# Patient Record
Sex: FEMALE | Race: WHITE
[De-identification: names, ages, dates, MRNs, and addresses within clinical notes are randomized per-mention and may not be internally consistent; named-entity substitution may affect disease eponyms.]

---

## 2018-03-13 NOTE — RAD
Thyroid ultrasound, 3/13/2018:



History: Thyroid enlargement



The right lobe of the gland measures 4.1 x 1.6 x 1.0 cm while the left lobe of

the gland measures 4.7 x 1.1 x 1.3 cm.  The gland is generally heterogeneous

with multiple tiny hypoechoic foci in both lobes.



There is a dominant 13 x 12 x 11 mm discrete solid nodule in the lower pole of

the left lobe of the gland.  It is relatively isoechoic.  Its margins are

smooth with a hypoechoic halo.  There is internal color flow..  No internal

calcifications are seen.



An 8 x 4 x 6 mm well-defined hypoechoic nodule is noted anteriorly in the mid

portion of the right lobe of the thyroid gland.  It demonstrates low level

internal echoes.  It is wider than tall.  No internal calcifications are

present.



A 4 x 5 x 4 mm smooth hypoechoic nodule is noted in the upper pole of the left

lobe of the gland.  This is probably a solid nodule.  No associated

calcifications are seen.





IMPRESSION: Heterogeneous multinodular thyroid gland as described above.  The

sonographic characteristics of the individual nodules are nonspecific.  No

highly suspicious features are seen.  Sonographic follow-up is suggested.

## 2018-04-09 ENCOUNTER — HOSPITAL ENCOUNTER (OUTPATIENT)
Dept: HOSPITAL 61 - KCIC MRI | Age: 38
Discharge: HOME | End: 2018-04-09
Payer: COMMERCIAL

## 2018-04-09 DIAGNOSIS — M94.261: Primary | ICD-10-CM

## 2018-04-09 PROCEDURE — 73721 MRI JNT OF LWR EXTRE W/O DYE: CPT

## 2020-01-02 ENCOUNTER — HOSPITAL ENCOUNTER (OUTPATIENT)
Dept: HOSPITAL 63 - PMG | Age: 40
Discharge: HOME | End: 2020-01-02
Attending: NURSE PRACTITIONER
Payer: COMMERCIAL

## 2020-01-02 DIAGNOSIS — R05: ICD-10-CM

## 2020-01-02 DIAGNOSIS — J45.909: Primary | ICD-10-CM

## 2020-01-02 PROCEDURE — 71046 X-RAY EXAM CHEST 2 VIEWS: CPT

## 2020-01-02 NOTE — RAD
CHEST PA   LATERAL

 

History: Shortness of breath 

 

Comparison: None.

 

Findings: 

Frontal and lateral views of chest were obtained. The cardiomediastinal 

silhouette is normal. Pulmonary vasculature is normal. The lungs are 

clear. No pleural effusion or pneumothorax is seen. There is no acute bone

abnormality. 

 

IMPRESSION: 

No acute cardiopulmonary process. 

 

 

Electronically signed by: Enrique Noble MD (1/2/2020 9:58 AM) [facility]

## 2021-02-05 ENCOUNTER — HOSPITAL ENCOUNTER (EMERGENCY)
Dept: HOSPITAL 63 - ER | Age: 41
Discharge: HOME | End: 2021-02-05
Payer: COMMERCIAL

## 2021-02-05 VITALS
DIASTOLIC BLOOD PRESSURE: 67 MMHG | SYSTOLIC BLOOD PRESSURE: 122 MMHG | BODY MASS INDEX: 33.56 KG/M2 | WEIGHT: 213.85 LBS | HEIGHT: 67 IN | SYSTOLIC BLOOD PRESSURE: 122 MMHG | DIASTOLIC BLOOD PRESSURE: 67 MMHG

## 2021-02-05 DIAGNOSIS — E03.9: ICD-10-CM

## 2021-02-05 DIAGNOSIS — I95.1: ICD-10-CM

## 2021-02-05 DIAGNOSIS — R55: ICD-10-CM

## 2021-02-05 DIAGNOSIS — T88.1XXA: Primary | ICD-10-CM

## 2021-02-05 DIAGNOSIS — Z88.1: ICD-10-CM

## 2021-02-05 DIAGNOSIS — Z88.7: ICD-10-CM

## 2021-02-05 DIAGNOSIS — Z88.2: ICD-10-CM

## 2021-02-05 DIAGNOSIS — J45.909: ICD-10-CM

## 2021-02-05 LAB
ALBUMIN SERPL-MCNC: 3.5 G/DL (ref 3.4–5)
ALP SERPL-CCNC: 63 U/L (ref 46–116)
ALT SERPL-CCNC: 18 U/L (ref 14–59)
AMPHETAMINE/METHAMPHETAMINE: (no result)
ANION GAP SERPL CALC-SCNC: 6 MMOL/L (ref 6–14)
APTT PPP: YELLOW S
AST SERPL-CCNC: 11 U/L (ref 15–37)
BACTERIA #/AREA URNS HPF: (no result) /HPF
BARBITURATES UR-MCNC: (no result) UG/ML
BASOPHILS # BLD AUTO: 0 X10^3/UL (ref 0–0.2)
BASOPHILS NFR BLD: 0 % (ref 0–3)
BENZODIAZ UR-MCNC: (no result) UG/L
BILIRUB DIRECT SERPL-MCNC: 0.1 MG/DL (ref 0–0.2)
BILIRUB SERPL-MCNC: 0.6 MG/DL (ref 0.2–1)
BILIRUB UR QL STRIP: (no result)
CA-I SERPL ISE-MCNC: 15 MG/DL (ref 7–20)
CALCIUM SERPL-MCNC: 8.4 MG/DL (ref 8.5–10.1)
CANNABINOIDS UR-MCNC: (no result) UG/L
CHLORIDE SERPL-SCNC: 102 MMOL/L (ref 98–107)
CHOLEST/HDLC SERPL: 4 {RATIO}
CO2 SERPL-SCNC: 27 MMOL/L (ref 21–32)
COCAINE UR-MCNC: (no result) NG/ML
CREAT SERPL-MCNC: 0.9 MG/DL (ref 0.6–1)
EOSINOPHIL NFR BLD: 0.1 X10^3/UL (ref 0–0.7)
EOSINOPHIL NFR BLD: 1 % (ref 0–3)
ERYTHROCYTE [DISTWIDTH] IN BLOOD BY AUTOMATED COUNT: 13.4 % (ref 11.5–14.5)
FIBRINOGEN PPP-MCNC: CLEAR MG/DL
GFR SERPLBLD BASED ON 1.73 SQ M-ARVRAT: 69.7 ML/MIN
GLUCOSE SERPL-MCNC: 134 MG/DL (ref 70–99)
GLUCOSE UR STRIP-MCNC: (no result) MG/DL
HCT VFR BLD CALC: 41 % (ref 36–47)
HDLC SERPL-MCNC: 51 MG/DL (ref 40–60)
HGB BLD-MCNC: 13.5 G/DL (ref 12–15.5)
LDLC: 155 MG/DL (ref 0–100)
LIPASE: 118 U/L (ref 73–393)
LYMPHOCYTES # BLD: 0.8 X10^3/UL (ref 1–4.8)
LYMPHOCYTES NFR BLD AUTO: 7 % (ref 24–48)
MAGNESIUM SERPL-MCNC: 1.9 MG/DL (ref 1.8–2.4)
MCH RBC QN AUTO: 30 PG (ref 25–35)
MCHC RBC AUTO-ENTMCNC: 33 G/DL (ref 31–37)
MCV RBC AUTO: 91 FL (ref 79–100)
METHADONE SERPL-MCNC: (no result) NG/ML
MONO #: 0.3 X10^3/UL (ref 0–1.1)
MONOCYTES NFR BLD: 3 % (ref 0–9)
NEUT #: 10.4 X10^3UL (ref 1.8–7.7)
NEUTROPHILS NFR BLD AUTO: 90 % (ref 31–73)
NITRITE UR QL STRIP: (no result)
OPIATES UR-MCNC: (no result) NG/ML
PCP SERPL-MCNC: (no result) MG/DL
PLATELET # BLD AUTO: 199 X10^3/UL (ref 140–400)
POTASSIUM SERPL-SCNC: 4 MMOL/L (ref 3.5–5.1)
PROT SERPL-MCNC: 6.9 G/DL (ref 6.4–8.2)
RBC # BLD AUTO: 4.49 X10^6/UL (ref 3.5–5.4)
RBC #/AREA URNS HPF: 0 /HPF (ref 0–2)
SODIUM SERPL-SCNC: 135 MMOL/L (ref 136–145)
SP GR UR STRIP: 1.02
SQUAMOUS #/AREA URNS LPF: (no result) /LPF
THYROID STIM HORMONE (TSH): 2.66 UIU/ML (ref 0.36–3.74)
TRIGL SERPL-MCNC: 96 MG/DL (ref 0–150)
UROBILINOGEN UR-MCNC: 0.2 MG/DL
VLDLC: 19 MG/DL (ref 0–40)
WBC # BLD AUTO: 11.6 X10^3/UL (ref 4–11)
WBC #/AREA URNS HPF: (no result) /HPF (ref 0–4)

## 2021-02-05 PROCEDURE — 80076 HEPATIC FUNCTION PANEL: CPT

## 2021-02-05 PROCEDURE — 81001 URINALYSIS AUTO W/SCOPE: CPT

## 2021-02-05 PROCEDURE — 80307 DRUG TEST PRSMV CHEM ANLYZR: CPT

## 2021-02-05 PROCEDURE — 83735 ASSAY OF MAGNESIUM: CPT

## 2021-02-05 PROCEDURE — 93005 ELECTROCARDIOGRAM TRACING: CPT

## 2021-02-05 PROCEDURE — 80048 BASIC METABOLIC PNL TOTAL CA: CPT

## 2021-02-05 PROCEDURE — 85610 PROTHROMBIN TIME: CPT

## 2021-02-05 PROCEDURE — 85025 COMPLETE CBC W/AUTO DIFF WBC: CPT

## 2021-02-05 PROCEDURE — 36415 COLL VENOUS BLD VENIPUNCTURE: CPT

## 2021-02-05 PROCEDURE — 80061 LIPID PANEL: CPT

## 2021-02-05 PROCEDURE — 81025 URINE PREGNANCY TEST: CPT

## 2021-02-05 PROCEDURE — 85730 THROMBOPLASTIN TIME PARTIAL: CPT

## 2021-02-05 PROCEDURE — 85379 FIBRIN DEGRADATION QUANT: CPT

## 2021-02-05 PROCEDURE — 96360 HYDRATION IV INFUSION INIT: CPT

## 2021-02-05 PROCEDURE — 71045 X-RAY EXAM CHEST 1 VIEW: CPT

## 2021-02-05 PROCEDURE — 84484 ASSAY OF TROPONIN QUANT: CPT

## 2021-02-05 PROCEDURE — 83880 ASSAY OF NATRIURETIC PEPTIDE: CPT

## 2021-02-05 PROCEDURE — 84443 ASSAY THYROID STIM HORMONE: CPT

## 2021-02-05 PROCEDURE — 82550 ASSAY OF CK (CPK): CPT

## 2021-02-05 PROCEDURE — 83690 ASSAY OF LIPASE: CPT

## 2021-02-05 PROCEDURE — 99285 EMERGENCY DEPT VISIT HI MDM: CPT

## 2021-02-05 NOTE — PHYS DOC
Past History


Past Medical History:  Anxiety, Asthma, Cancer, Hypothyroid


Past Medical History


History of vasovagal, orthostatic positional hypotension


Past Surgical History:  Cancer Surgery





General Adult


EDM:


Chief Complaint:  NEAR SYCOPE





HPI:


HPI:


".. I got my second Moderna  . ..COVID shot at [employer] yesterday..  and since 

that time I feel like I got the flu.. and the shot site is hot.. tender.. Kind 

of hurt every where, I feel generalized malaise and myalgia just like I got the 

flu... But I have had a history in the past of vasovagal syndrome.  Short story 

is ..  I got up to go the bathroom... And I was on the commode and I guess I 

passed out after I urinated... ".. I was fine after I left the bathroom..  but I

then had a few more episodes where I got dizzy and passed out in front of my 

.. he called the ambulance...I ve had all the syncope things work up 

before..."'.. This happen sometimes with me.. and I ve seen a carodioloy and had

it all worked up."  ".. It 's just hat I am having some post vaccination thing..

and I got my  dizzy and syncope things all stirred up..."





Patient is a 40 year old female who presents with above hx and complaints 

syncope, malaise, arthralgia, myalgia, localized injection site edema and pain, 

and subjective fevers after receiving second series of her Moderna yesterday 

morning at 830 hours at [facility].  Patient has a past history of vasovagal,

orthostatic positional hypotension, thyroid cancer and status post 

thyroidectomy, now has hypothyroidism,  asthma.  Patient follows with [facility] for 

care.  Patient works as a  at [employer].  No recent travel.  No 

specific ill contacts.  No history of trauma.





Review of Systems:


Review of Systems:


Constitutional: Subjective fever or chills 


Eyes:  Denies change in visual acuity 


HENT:  Denies nasal congestion or sore throat 


Respiratory:  Denies cough or shortness of breath 


Cardiovascular:  Denies chest pain or edema .  Does have a history of dizziness 

if she stands up very quickly.


GI:  Denies abdominal pain, , vomiting, bloody stools or diarrhea .  Patient 

complains of some nausea


: Denies dysuria 


Musculoskeletal: Generalized myalgia and arthralgia. 


Integument:  Denies rash 


Neurologic:  Denies headache, focal weakness or sensory changes 


Endocrine:  Denies polyuria or polydipsia 


Lymphatic:  Denies swollen glands 


Psychiatric:  Hx. anxiety





Family History:


Family History:


Noncontributory to presentation





Current Medications:


Current Meds:


See nursing for home meds





Allergies:


Allergies:





Allergies








Coded Allergies Type Severity Reaction Last Updated Verified


 


  Sulfa (Sulfonamide Antibiotics) Allergy Unknown  21 Yes


 


  Tetanus Vaccines and Toxoid Allergy Unknown  21 Yes


 


  cephalexin Allergy Unknown  21 Yes











Physical Exam:


PE:














Constitutional: , no acute distress, non-toxic appearance. []


HENT: Normocephalic, atraumatic, bilateral external ears normal, oropharynx dry,

 no oral exudates, nose normal. []


Eyes: PERRLA, EOMI, conjunctiva normal, no discharge. [] 


Neck: Normal range of motion, no tenderness, supple, no stridor.  Old surgical 

scar


Cardiovascular: Tachycardia heart rate regular rhythm, no murmur []


Lungs & Thorax:  Bilateral breath sounds equal at apex, few basilar crackles 

auscultation []


Abdomen: Bowel sounds normal, soft, no tenderness, no masses, no pulsatile 

masses.  Old surgery scars.


Skin: Warm, dry, no erythema, no rash. [] 


Back: No tenderness, no CVA tenderness. [] 


Extremities: No tenderness, no cyanosis, no clubbing, ROM intact, no edema. [] 


Neurologic: Alert and oriented X 3, moves all extremities on request, does have 

distal sensory, no focal deficits noted.  DTRs +2 patella and brachial.  No 

drift.   equal.


Psychologic: Affect anxious, judgement normal, mood normal. []





EKG:


EKG:


My interpretation EKG shows sinus rhythm at 92 bpm.  No findings of acute STEMI 

of contralateral changes []





Radiology/Procedures:


Radiology/Procedures:


[][facility] [address], [place] [address] [phone]


                                        


                                 IMAGING REPORT





                                     Signed





PATIENT: FARZANA SMALL  ACCOUNT: [id number]     MRN#: [id number]


: 1980           LOCATION: ER              AGE: 40


SEX: F                    EXAM DT: 21         ACCESSION#: [id number]


STATUS: REG ER            ORD. PHYSICIAN: MATTY LAMBERT MD


REASON: dyspnea, syncope


PROCEDURE: PORTABLE CHEST 1V





AP chest x-ray





HISTORY: Dyspnea, syncope.





COMPARISON: Chest x-ray 2020.





FINDINGS: Heart size normal. Mediastinal silhouette is normal. Surgical clips 

lower neck. No pneumothorax, pulmonary opacities or pleural effusions. Bones are

 unremarkable.





IMPRESSION: No acute process.





Electronically signed by: Feroz Berman MD (2021 2:52 AM) [facility]














DICTATED AND SIGNED BY:     FEROZ BERMAN MD


DATE:     21





CC: MATTY LAMBERT MD; CONCHIS CANCINO ~MTH0 0





Heart Score:


HEART Score for Chest Pain:  








HEART Score for Chest Pain Response (Comments) Value


 


History Slighlty/Non-Suspicious 0


 


ECG Normal 0


 


Age < 45 0


 


Risk Factors                            1 or 2 Risk Factors 1


 


Troponin < Normal Limit 0


 


Total  1








Risk Factors:


Risk Factors:  DM, Current or recent (<one month) smoker, HTN, HLP, family 

history of CAD, obesity.


Risk Scores:


Score 0 - 3:  2.5% MACE over next 6 weeks - Discharge Home


Score 4 - 6:  20.3% MACE over next 6 weeks - Admit for Clinical Observation


Score 7 - 10:  72.7% MACE over next 6 weeks - Early Invasive Strategies





Course & Med Decision Making:


Course & Med Decision Making


Pertinent Labs and Imaging studies reviewed. (See chart for details)





Symptoms gradually resolved while in ED department.  Patient discharged home.  

Patient to take meds as previous directed.  The patient push fluids.  Patient 

take Tylenol and ibuprofen as needed for discomfort.  Continue thyroid meds.  

Follow-up pending labs.  Return if any concerns.

















Impression:





1.  Vasovagal syndrome


2.  History of positional orthostatic hypotension


3.  Recent Covid vaccination  21-  Moderna  x 2


4.  Post vaccination syndrome-(Localized vaccination injection site inflammation

 and myalgia/arthralgia, malaise)








[]





Dragon Disclaimer:


Dragon Disclaimer:


This electronic medical record was generated, in whole or in part, using a voice

 recognition dictation system.





Departure


Departure:


Referrals:  


DEONTE SEQUEIRA (PCP)





Dragon Disclaimer


This chart was dictated in whole or in part using Voice Recognition software in 

a busy, high-work load, and often noisy Emergency Department environment.  It 

may contain unintended and wholly unrecognized errors or omissions.





Dragon Disclaimer


This chart was dictated in whole or in part using Voice Recognition software in 

a busy, high-work load, and often noisy Emergency Department environment.  It 

may contain unintended and wholly unrecognized errors or omissions.











MATTY LAMBERT MD            2021 01:29

## 2021-02-05 NOTE — RAD
AP chest x-ray



HISTORY: Dyspnea, syncope.



COMPARISON: Chest x-ray January 2, 2020.



FINDINGS: Heart size normal. Mediastinal silhouette is normal. Surgical clips lower neck. No pneumoth
orax, pulmonary opacities or pleural effusions. Bones are unremarkable.



IMPRESSION: No acute process.



Electronically signed by: Vince Berman MD (2/5/2021 2:52 AM) [facility]

## 2021-02-05 NOTE — EKG
[facility] [address], [place] [address]

Test Date:    2021               Test Time:    00:59:04

Pat Name:     FARZANA SMALL          Department:   

Patient ID:   [id number]           Room:          

Gender:       F                        Technician:   

:          1980               Requested By: MATTY LAMBERT

Order Number: [id number]            Reading MD:   Harish Benson

                                 Measurements

Intervals                              Axis          

Rate:         92                       P:            0

NE:           148                      QRS:          71

QRSD:         80                       T:            5

QT:           334                                    

QTc:          418                                    

                           Interpretive Statements

SINUS RHYTHM

NORMAL ECG

RI6.02

No previous ECG available for comparison

Electronically Signed On 2021 10:08:13 CST by Harish Benson

## 2021-12-10 ENCOUNTER — HOSPITAL ENCOUNTER (OUTPATIENT)
Dept: HOSPITAL 63 - US | Age: 41
End: 2021-12-10
Attending: PHYSICIAN ASSISTANT
Payer: COMMERCIAL

## 2021-12-10 DIAGNOSIS — N83.01: Primary | ICD-10-CM

## 2021-12-10 DIAGNOSIS — N88.8: ICD-10-CM

## 2021-12-10 PROCEDURE — 76830 TRANSVAGINAL US NON-OB: CPT

## 2021-12-10 PROCEDURE — 76856 US EXAM PELVIC COMPLETE: CPT

## 2021-12-13 NOTE — RAD
US PELVIS W/TV



History: Reason: PELVIC PAIN / Spl. Instructions:  / History: 



Comparison: None



Technique: Grayscale and color Doppler imaging of the pelvis was performed using transabdominal and t
ransvaginal  technique.



Findings:

The uterus measures 8.2 x 4.6 x 3.6 cm.  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
 appearance. Nabothian cysts noted.  The endometrial stripe measures 4 mm.



Right ovary measures 2.5 x 2.6 x 2.3 cm. Dominant right ovarian follicle measures 2.1 cm.



Left ovary not identified due to positioning and overlying structures.



Small pelvic free fluid.



IMPRESSION:

1.  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

2.  Small pelvic free fluid, likely physiologic.



Electronically signed by: Josemanuel Gonzalez DO (12/13/2021 8:37 AM) [id number]